# Patient Record
Sex: FEMALE | Race: WHITE | Employment: STUDENT | URBAN - METROPOLITAN AREA
[De-identification: names, ages, dates, MRNs, and addresses within clinical notes are randomized per-mention and may not be internally consistent; named-entity substitution may affect disease eponyms.]

---

## 2018-05-07 NOTE — PROGRESS NOTES
Subjective:       Patient ID: Nikhil Sweeney is a 22 y.o. female with a PMH significant for Hypothyroidism who presents today to establish care.    Chief Complaint: Hypothyroidism (0.433 - TSH)    HPI Patient's mother with Hashimoto's.  Patient has been getting routine thyroid screening since age 19.  She had a recent TSH thru school - TSH was 0.433 and fT4 of 1.32 on 4/18/2018.  She has been depressed lately - no trigger. No SI's.  Has been seeing Psychotherapy and [facility]iatry at [facility] and they wanted to prescribe Prozac, but patient declined.  Patient denies f/c, n/v/d.  No chest pain or SOB.  No abdominal pain or dysuria.  No headaches or change in vision.  No dizziness.  No significant  weight gain or weight loss.  Remaining ROS negative.    Review of Systems   Constitutional: Positive for fatigue. Negative for appetite change, chills, diaphoresis, fever and unexpected weight change.   HENT: Negative for ear pain, hearing loss, sinus pain, tinnitus, trouble swallowing and voice change.    Eyes: Negative for photophobia, pain and visual disturbance.   Respiratory: Negative for chest tightness, shortness of breath and wheezing.    Cardiovascular: Negative for chest pain, palpitations and leg swelling.   Gastrointestinal: Negative for abdominal pain, blood in stool, constipation, diarrhea, nausea and vomiting.        Increased soft BMs, but no diarrhea   Endocrine: Negative for cold intolerance, heat intolerance, polydipsia, polyphagia and polyuria.   Genitourinary: Negative for decreased urine volume, difficulty urinating, dysuria, flank pain, hematuria, pelvic pain, vaginal bleeding, vaginal discharge and vaginal pain.   Musculoskeletal: Negative for arthralgias, gait problem, joint swelling, myalgias and neck pain.   Skin: Negative for rash.   Neurological: Negative for dizziness, tremors, syncope, weakness, numbness and headaches.   Hematological: Does not bruise/bleed easily.   Psychiatric/Behavioral: Positive for  dysphoric mood. Negative for agitation, confusion and sleep disturbance. The patient is not nervous/anxious.        Objective:      Physical Exam   Constitutional: She is oriented to person, place, and time. She appears well-developed and well-nourished. No distress.   HENT:   Head: Normocephalic and atraumatic.   Nose: Nose normal.   Mouth/Throat: Oropharynx is clear and moist.   Eyes: Conjunctivae and EOM are normal. Pupils are equal, round, and reactive to light. No scleral icterus.   Neck: Normal range of motion. Neck supple. No JVD present. No tracheal deviation present. No thyromegaly present.   Cardiovascular: Normal rate, regular rhythm and intact distal pulses.  Exam reveals no gallop and no friction rub.    No murmur heard.  Pulmonary/Chest: Effort normal and breath sounds normal. No respiratory distress. She has no wheezes. She has no rales.   Abdominal: Soft. Bowel sounds are normal. She exhibits no distension. There is no tenderness. There is no rebound and no guarding.   Musculoskeletal: Normal range of motion. She exhibits no edema or deformity.   Lymphadenopathy:     She has no cervical adenopathy.   Neurological: She is alert and oriented to person, place, and time. She displays normal reflexes. No cranial nerve deficit or sensory deficit. She exhibits normal muscle tone. Coordination normal.   Skin: Skin is warm and dry. No rash noted. No erythema.   Psychiatric: She has a normal mood and affect. Her behavior is normal. Thought content normal.       Assessment:       1. Encounter for wellness examination in adult    2. Low TSH level    3. Family history of hypothyroidism    4. DDD (degenerative disc disease), lumbar        Plan:   -Adult Wellness Exam - blood pressure and exam were stable.  I will order routine fasting labs.   We discussed STD screening - HIV 1/2, RPR, Gc/Chlamydia negative in 4/2018 at [place].  -Endocrine - Low TSH with Normal fT4 - suspect subclinical Hyperthyroidism.  Will  repeat TSH, fT4, T3, TPA, TSI.  Will check US of thyroid.  Mother with Hashimoto's Thyroiditis/Hypothyroidism.  -Psych - Depression - as above, followed by [facility] and declined Prozac.  Continue with Psychotherapy.  Exercise.  -MSK - L4-L5 DDD - s/p steroid injections in 2016.  Will refer to [facility], as she would like to start an exercise program.  -GYN - Last Pap was summer 2017 and normal.  We discussed HPV vaccinations - never had them and declined.  -HCM - We discussed Flu (recommend in the Fall) and Tdap (2011) vaccinations.

## 2018-05-08 ENCOUNTER — HOSPITAL ENCOUNTER (OUTPATIENT)
Dept: RADIOLOGY | Facility: OTHER | Age: 23
Discharge: HOME | End: 2018-05-08
Attending: INTERNAL MEDICINE
Payer: COMMERCIAL

## 2018-05-08 ENCOUNTER — PATIENT MESSAGE (OUTPATIENT)
Dept: INTERNAL MEDICINE | Facility: CLINIC | Age: 23
End: 2018-05-08

## 2018-05-08 ENCOUNTER — OFFICE VISIT (OUTPATIENT)
Dept: INTERNAL MEDICINE | Facility: CLINIC | Age: 23
End: 2018-05-08
Payer: COMMERCIAL

## 2018-05-08 VITALS
BODY MASS INDEX: 22.6 KG/M2 | SYSTOLIC BLOOD PRESSURE: 118 MMHG | TEMPERATURE: 98 F | OXYGEN SATURATION: 100 % | HEIGHT: 66 IN | DIASTOLIC BLOOD PRESSURE: 88 MMHG | WEIGHT: 140.63 LBS | HEART RATE: 65 BPM

## 2018-05-08 DIAGNOSIS — R79.89 LOW TSH LEVEL: ICD-10-CM

## 2018-05-08 DIAGNOSIS — Z00.00 ENCOUNTER FOR WELLNESS EXAMINATION IN ADULT: Primary | ICD-10-CM

## 2018-05-08 DIAGNOSIS — Z83.49 FAMILY HISTORY OF HYPOTHYROIDISM: ICD-10-CM

## 2018-05-08 DIAGNOSIS — M51.36 DDD (DEGENERATIVE DISC DISEASE), LUMBAR: ICD-10-CM

## 2018-05-08 PROCEDURE — 3008F BODY MASS INDEX DOCD: CPT | Mod: CPTII,S$GLB,, | Performed by: INTERNAL MEDICINE

## 2018-05-08 PROCEDURE — 76536 US EXAM OF HEAD AND NECK: CPT | Mod: TC

## 2018-05-08 PROCEDURE — 99204 OFFICE O/P NEW MOD 45 MIN: CPT | Mod: S$GLB,,, | Performed by: INTERNAL MEDICINE

## 2018-05-08 PROCEDURE — 76536 US EXAM OF HEAD AND NECK: CPT | Mod: 26,,, | Performed by: RADIOLOGY

## 2018-05-08 PROCEDURE — 99999 PR PBB SHADOW E&M-NEW PATIENT-LVL IV: CPT | Mod: PBBFAC,,, | Performed by: INTERNAL MEDICINE

## 2018-05-08 NOTE — PATIENT INSTRUCTIONS
Your exam was overall normal today.  Your blood pressure was good.  I will order routine fasting labs today - at least 6-8 hours of fasting.    Will will do a work-up on your thyroid function, as well as check an ultrasound of your thyroid gland.  [facility] will [facility].  Return in 4 weeks - sooner if needed.  Please come at least 15-20 minutes before your scheduled appointment time.

## 2018-05-09 ENCOUNTER — PATIENT MESSAGE (OUTPATIENT)
Dept: INTERNAL MEDICINE | Facility: CLINIC | Age: 23
End: 2018-05-09

## 2018-05-10 ENCOUNTER — PATIENT MESSAGE (OUTPATIENT)
Dept: INTERNAL MEDICINE | Facility: CLINIC | Age: 23
End: 2018-05-10

## 2018-05-10 DIAGNOSIS — R93.89 ABNORMAL THYROID ULTRASOUND: Primary | ICD-10-CM

## 2018-05-19 ENCOUNTER — OFFICE VISIT (OUTPATIENT)
Dept: URGENT CARE | Facility: CLINIC | Age: 23
End: 2018-05-19
Payer: COMMERCIAL

## 2018-05-19 VITALS
BODY MASS INDEX: 23.52 KG/M2 | WEIGHT: 141.13 LBS | SYSTOLIC BLOOD PRESSURE: 111 MMHG | OXYGEN SATURATION: 100 % | HEIGHT: 65 IN | TEMPERATURE: 99 F | DIASTOLIC BLOOD PRESSURE: 76 MMHG | HEART RATE: 85 BPM

## 2018-05-19 DIAGNOSIS — J03.90 EXUDATIVE TONSILLITIS: Primary | ICD-10-CM

## 2018-05-19 PROCEDURE — 96372 THER/PROPH/DIAG INJ SC/IM: CPT | Mod: S$GLB,,, | Performed by: INTERNAL MEDICINE

## 2018-05-19 PROCEDURE — 3008F BODY MASS INDEX DOCD: CPT | Mod: CPTII,S$GLB,, | Performed by: INTERNAL MEDICINE

## 2018-05-19 PROCEDURE — 99213 OFFICE O/P EST LOW 20 MIN: CPT | Mod: 25,S$GLB,, | Performed by: INTERNAL MEDICINE

## 2018-05-19 RX ORDER — BETAMETHASONE SODIUM PHOSPHATE AND BETAMETHASONE ACETATE 3; 3 MG/ML; MG/ML
9 INJECTION, SUSPENSION INTRA-ARTICULAR; INTRALESIONAL; INTRAMUSCULAR; SOFT TISSUE ONCE
Status: COMPLETED | OUTPATIENT
Start: 2018-05-19 | End: 2018-05-19

## 2018-05-19 RX ORDER — AMOXICILLIN AND CLAVULANATE POTASSIUM 875; 125 MG/1; MG/1
1 TABLET, FILM COATED ORAL EVERY 12 HOURS
Qty: 20 TABLET | Refills: 0 | Status: SHIPPED | OUTPATIENT
Start: 2018-05-19 | End: 2018-05-29

## 2018-05-19 RX ADMIN — BETAMETHASONE SODIUM PHOSPHATE AND BETAMETHASONE ACETATE 9 MG: 3; 3 INJECTION, SUSPENSION INTRA-ARTICULAR; INTRALESIONAL; INTRAMUSCULAR; SOFT TISSUE at 01:05

## 2018-05-19 NOTE — PROGRESS NOTES
"Subjective:       Patient ID: Nikhil Sweeney is a 22 y.o. female.    Vitals:  height is 5' 5" (1.651 m) and weight is 64 kg (141 lb 1.5 oz). Her oral temperature is 99.3 °F (37.4 °C). Her blood pressure is 111/76 and her pulse is 85. Her oxygen saturation is 100%.     Chief Complaint: Facial Swelling    Swollen node on right side of neck.  Low grade fever today- no fever before today      Edema   This is a new problem. The current episode started in the past 7 days (past 3 days). The problem has been gradually worsening. Associated symptoms include a sore throat. Pertinent negatives include no abdominal pain, chest pain, chills, fever, headaches, nausea, rash or vomiting. The symptoms are aggravated by drinking and eating. She has tried nothing for the symptoms.     Review of Systems   Constitution: Positive for malaise/fatigue. Negative for chills and fever.   HENT: Positive for sore throat.    Eyes: Negative for blurred vision.   Cardiovascular: Negative for chest pain.   Respiratory: Negative for shortness of breath.    Skin: Negative for rash.   Musculoskeletal: Negative for back pain and joint pain.   Gastrointestinal: Negative for abdominal pain, diarrhea, nausea and vomiting.   Neurological: Negative for headaches.   Psychiatric/Behavioral: The patient is not nervous/anxious.        Objective:      Physical Exam   Constitutional: She appears well-developed and well-nourished.   HENT:   Head: Normocephalic and atraumatic.   Mouth/Throat:       Eyes: Conjunctivae and EOM are normal. Pupils are equal, round, and reactive to light.   Neck: Normal range of motion. Neck supple.   R swollen,tender submandibular gland   Cardiovascular: Normal rate and regular rhythm.    Pulmonary/Chest: Effort normal and breath sounds normal.   Nursing note and vitals reviewed.      Assessment:       1. Exudative tonsillitis        Plan:         Exudative tonsillitis  -     betamethasone acetate-betamethasone sodium phosphate injection 9 " mg; Inject 1.5 mLs (9 mg total) into the muscle once.  -     amoxicillin-clavulanate 875-125mg (AUGMENTIN) 875-125 mg per tablet; Take 1 tablet by mouth every 12 (twelve) hours.  Dispense: 20 tablet; Refill: 0

## 2018-05-20 ENCOUNTER — PATIENT MESSAGE (OUTPATIENT)
Dept: INTERNAL MEDICINE | Facility: CLINIC | Age: 23
End: 2018-05-20

## 2018-05-22 ENCOUNTER — PATIENT MESSAGE (OUTPATIENT)
Dept: ADMINISTRATIVE | Facility: HOSPITAL | Age: 23
End: 2018-05-22

## 2018-05-22 ENCOUNTER — PATIENT OUTREACH (OUTPATIENT)
Dept: ADMINISTRATIVE | Facility: HOSPITAL | Age: 23
End: 2018-05-22

## 2018-05-22 NOTE — PROGRESS NOTES
[facility] is committed to your overall health.  To help you get the most out of each of your visits, we will review your information to make sure you are up to date on all of your recommended tests and/or procedures.       Your PCP  Giuliana Carias MD   found that you may be due for:       Health Maintenance Due   Topic Date Due    HPV Vaccines (1 of 3 - Female 3 Dose Series) 09/04/2005    CHLAMYDIA SCREENING  09/04/2009    TETANUS VACCINE  09/04/2012    Pap Smear  09/04/2015     Please be prepared to sign release of information so that we obtain medical records for care that you received outside of [facility]. This is done to make sure your medical record is complete and that you receive the best of care. Thank you.     If you have had any of the above done at another facility, please bring the records or information with you so that your record at [facility] will be complete.  If you would like to schedule any of these, please contact me.     If you are currently taking medication, please bring it with you to your appointment for review.     Also, if you have any type of Advanced Directives, please bring them with you to your office visit so we may scan them into your chart.       Thank you for Choosing [facility] for your healthcare needs.        Additional Information  If you have questions, you can email [email or web address] or call [phone]  to talk to our [facility] staff. Remember, [facility] is NOT to be used for urgent needs. For medical emergencies, dial 911.

## 2018-06-01 NOTE — PROGRESS NOTES
Subjective:       Patient ID: Nikhil Sweeney is a 22 y.o. female with a PMH significant for Hypothyroidism who was seen initially by me on 5/8/2018.    Chief Complaint: No chief complaint on file.    HPI  Patient states she had a fainting spell a week ago - didn't eat and felt a little dizzy/faint.  Her eyes and body felt heavy.  She went to lay down and when she hit the bed, she fainted an found herself on the floor.  No confusion afterwards. No recurrent symptoms.  No palpitations.  No chest pains.   Patient denies f/c, n/v/d.  No chest pain or SOB.  No abdominal pain or dysuria.  No headaches or change in vision.  No dizziness.  No significant  weight gain or weight loss.  Remaining ROS negative.    Review of Systems   Constitutional: Negative for appetite change, chills, diaphoresis, fatigue, fever and unexpected weight change.   HENT: Negative for ear pain, hearing loss, sinus pain, tinnitus, trouble swallowing and voice change.    Eyes: Negative for photophobia, pain and visual disturbance.   Respiratory: Negative for chest tightness, shortness of breath and wheezing.    Cardiovascular: Negative for chest pain, palpitations and leg swelling.   Gastrointestinal: Negative for abdominal pain, blood in stool, constipation, diarrhea, nausea and vomiting.        Increased soft BMs, but no diarrhea   Endocrine: Negative for cold intolerance, heat intolerance, polydipsia, polyphagia and polyuria.   Genitourinary: Negative for decreased urine volume, difficulty urinating, dysuria, flank pain, hematuria, pelvic pain, vaginal bleeding, vaginal discharge and vaginal pain.   Musculoskeletal: Negative for arthralgias, gait problem, joint swelling, myalgias and neck pain.   Skin: Negative for rash.   Neurological: Positive for dizziness. Negative for tremors, syncope, weakness, numbness and headaches.   Hematological: Does not bruise/bleed easily.   Psychiatric/Behavioral: Positive for dysphoric mood. Negative for agitation,  confusion and sleep disturbance. The patient is not nervous/anxious.        Objective:      Physical Exam   Constitutional: She is oriented to person, place, and time. She appears well-developed and well-nourished. No distress.   HENT:   Head: Normocephalic and atraumatic.   Nose: Nose normal.   Mouth/Throat: Oropharynx is clear and moist.   Eyes: Conjunctivae and EOM are normal. Pupils are equal, round, and reactive to light. No scleral icterus.   No nystagmus   Neck: Normal range of motion. Neck supple. No JVD present. No tracheal deviation present. No thyromegaly present.   No carotid bruits   Cardiovascular: Normal rate, regular rhythm, normal heart sounds and intact distal pulses.  Exam reveals no gallop and no friction rub.    No murmur heard.  Pulmonary/Chest: Effort normal and breath sounds normal. No respiratory distress. She has no wheezes. She has no rales.   Abdominal: Soft. Bowel sounds are normal. She exhibits no distension. There is no tenderness. There is no rebound and no guarding.   Musculoskeletal: Normal range of motion. She exhibits no edema or deformity.   Lymphadenopathy:     She has no cervical adenopathy.   Neurological: She is alert and oriented to person, place, and time. She displays normal reflexes. No cranial nerve deficit or sensory deficit. She exhibits normal muscle tone. Coordination normal.   Skin: Skin is warm and dry. No rash noted. She is not diaphoretic. No erythema.   Psychiatric: She has a normal mood and affect. Her behavior is normal. Thought content normal.       Assessment:       1. Vasovagal syncope    2. Thyroid disorder    3. Depression, unspecified depression type    4. Vitamin D insufficiency    5. DDD (degenerative disc disease), lumbar        Plan:   -Today's Visit -   Patient is awake and alert and in NAD.  We reviewed her recent labs.  -Cards - Suspect Vaso Vagal Syncope - atient told MA that she actually fainted after having sex - happened a few minutes after  completion of intercourse when she when to get water and came back to bed.  Will check Orthostatics - normal.  Cardiac exam normal.  No carotic bruits or nystagmus.  Will check EKG.  Will monitor for now.  Consider TTE with recurrent symptoms.  -Endocrine - History of Low TSH with Normal fT4 - suspect subclinical Hyperthyroidism.  Repeat TSH, fT4, T3, TPA, TSI all normal in 5/2018.  US of thyroid 5/8/2018 showed a sinle 0.4mm nodule in the right thyroid lobe (no follow up is required for this size) and suggestion of diffuse hypervascularity of the right thyroid lobe.  .  Mother with Hashimoto's Thyroiditis/Hypothyroidism.  Has Endocrine follow up 9/24/2018.  Vitamin D was low at 17 in 5/2018 - recommended D3 at 2000 units daily.  Lipids in 5/2018 normal.  -Psych - Depression - as above, followed by [facility] and on Prozac (on 3 months - tapering off).  Continue with Psychotherapy.  Exercise.  Overall stable.  -MSK - L4-L5 DDD - s/p steroid injections in 2016.  Referred to [facility] and has appointment today.  -GYN - Last Pap was summer 2017 and normal.  We discussed HPV vaccinations - never had them and declined.  -HCM - We discussed Flu (recommend in the Fall) and Tdap (2011) vaccinations.  We discussed STD screening in 5/2018 - HIV 1/2, RPR, GC/Chlamydia negative in 4/2018 at [facility].  -Follow up in 1 year - sooner if needed.

## 2018-06-04 NOTE — PROGRESS NOTES
Subjective:      Patient ID: Nikhil Sweeney is a 22 y.o. female.    Chief Complaint: Low-back Pain    Referred by: Alex Segovia MD     Ms Sweeney is a 21 yo female here for evaluation of low back pain.  She has a history of DDD at L4-5 with an injection in 2016.  She has had low back pain for the past 5 years.  The pain was worse 2.5 years ago, after long flight, then it got better.  Then nov 2016 she felt an electrical shock through whole back and was given muscle relaxer and went to [place], and then had EVIE.  The injection was very helpful with no pain until jan 2018.  The pain was not as severe.  The pain comes and goes at times.  It will check up on her at times.  The last time was with driving to [place].  The pain is not as bad as it use to be.  She has an MRI but forgot to bring from 2 years ago.  She has pain with sitting, lifting, moving, bending.  She feels like she cannot do certain exercises, when she tries things at the gym like planks her back will hurt.  The pain is worse in the lower back and will feel a pull in right hamstring.  The pain is not constant.  She has not had leg pain recently, she has not had leg pain since the injection in 2016.  The pain is sharp shooting and stabbing pain.  Pain is 0/10 now, worst 6/10 driving, best 0/10 standing and walking.  She cannot stand too long.      Past Medical History:  No date: Depression  No date: Thyroid disease    Past Surgical History:  2016: epidural steroid injection      Comment: Lumbar    Review of patient's family history indicates:  Problem: Hashimoto's thyroiditis      Relation: Mother       Age of Onset: (Not Specified)   Problem: Depression      Relation: Mother       Age of Onset: (Not Specified)   Problem: Anxiety disorder      Relation: Mother       Age of Onset: (Not Specified)   Problem: Heart disease      Relation: Father       Age of Onset: (Not Specified)   Problem: Hypertension      Relation: Father       Age of Onset: (Not Specified)    Problem: Stroke      Relation: Maternal Grandmother       Age of Onset: (Not Specified)   Problem: Hypertension      Relation: Maternal Grandmother       Age of Onset: (Not Specified)   Problem: Heart disease      Relation: Maternal Grandfather       Age of Onset: (Not Specified)   Problem: Cancer      Relation: Paternal Grandfather       Age of Onset: (Not Specified)   Problem: Alcohol abuse      Relation: Paternal Grandfather       Age of Onset: (Not Specified)       Social History    Marital status: Single              Spouse name:                       Years of education:                 Number of children:               Social History Main Topics    Smoking status: Former Smoker                                                                Packs/day: 0.00      Years: 0.00           Types: Cigarettes       Quit date: 5/5/2018    Smokeless tobacco: Former User                       Comment: Just quit 3 days ago, before that, smoked 3              cigarettes per day.     Alcohol use: Yes                Comment: Occasional    Drug use: No              Sexual activity: Yes                      No current outpatient prescriptions on file.  No current facility-administered medications for this visit.       Review of patient's allergies indicates:   -- Acetaminophen -- Hives    --  Difficulty swallowing, drowsiness                Review of Systems   Constitution: Negative for weight gain and weight loss.   Cardiovascular: Negative for chest pain.   Respiratory: Negative for shortness of breath.    Musculoskeletal: Positive for back pain. Negative for joint pain and joint swelling.   Gastrointestinal: Negative for abdominal pain and bowel incontinence.   Genitourinary: Negative for bladder incontinence.   Neurological: Negative for numbness and paresthesias.           Objective:          General    Vitals reviewed.  Constitutional: She is oriented to person, place, and time. She appears well-developed and  well-nourished.   HENT:   Head: Normocephalic and atraumatic.   Pulmonary/Chest: Effort normal.   Neurological: She is alert and oriented to person, place, and time.   Psychiatric: She has a normal mood and affect. Her behavior is normal. Judgment and thought content normal.     General Musculoskeletal Exam   Gait: normal     Right Ankle/Foot Exam     Tests   Heel Walk: able to perform  Tiptoe Walk: able to perform    Left Ankle/Foot Exam     Tests   Heel Walk: able to perform  Tiptoe Walk: able to perform  Back (L-Spine & T-Spine) / Neck (C-Spine) Exam     Tenderness Right paramedian tenderness of the Sacrum.     Back (L-Spine & T-Spine) Range of Motion   Extension: 20   Flexion: 90   Lateral Bend Right: 20   Lateral Bend Left: 20   Rotation Right: 40   Rotation Left: 40     Spinal Sensation   Right Side Sensation  C-Spine Level: normal   L-Spine Level: normal  S-Spine Level: normal  Left Side Sensation  C-Spine Level: normal  L-Spine Level: normal  S-Spine Level: normal    Back (L-Spine & T-Spine) Tests   Right Side Tests  Straight leg raise:      Sitting SLR: > 70 degrees      Left Side Tests  Straight leg raise:     Sitting SLR: > 70 degrees          Other She has no scoliosis .  Spinal Kyphosis:  Absent      Muscle Strength   Right Upper Extremity   Biceps: 5/5/5   Deltoid:  5/5  Triceps:  5/5  Wrist Extension: 5/5/5   Finger Flexors:  5/5  Left Upper Extremity  Biceps: 5/5/5   Deltoid:  5/5  Triceps:  5/5  Wrist Extension: 5/5/5   Finger Flexors:  5/5  Right Lower Extremity   Hip Flexion: 5/5   Quadriceps:  5/5   Anterior tibial:  5/5/5  EHL:  5/5  Left Lower Extremity   Hip Flexion: 5/5   Quadriceps:  5/5   Anterior tibial:  5/5/5   EHL:  5/5    Reflexes     Left Side  Biceps:  2+  Triceps:  2+  Brachioradialis:  2+  Quadriceps:  2+  Achilles:  2+  Left Gutierrez's Sign:  Absent  Babinski Sign:  absent    Right Side   Biceps:  2+  Triceps:  2+  Brachioradialis:  2+  Quadriceps:  2+  Achilles:  2+  Right  Gutierrez's Sign:  absent  Babinski Sign:  absent    Vascular Exam     Right Pulses        Carotid:                  2+    Left Pulses        Carotid:                  2+        Assessment:       Encounter Diagnoses   Name Primary?    Chronic midline low back pain without sciatica Yes    DDD (degenerative disc disease), lumbar          Plan:       Nikhil was seen today for low-back pain.    Diagnoses and all orders for this visit:    Chronic midline low back pain without sciatica  -     Ambulatory consult to [facility]    DDD (degenerative disc disease), lumbar  -     X-Ray Lumbar Complete With Flex And Ext; Future         More than 50% of the total time of 45 minutes was spent in counseling on diagnosis and treatment options. We discussed back pain and the nature of back pain.  We discussed that it is not one thing that causes the pain but an accumulation of multiple things that we do.  She got relief from an EVIE, and pain has not returned to that level, but she does not want it too.  She will feel it flare at times and worries about future because her Dad has back pain.  We discussed posture sitting and the importance of trying to sit better.  She juan to be more aware.  She has a standing desk, we discussed alerts on the phone. We discussed the benefits of therapy and exercise and continuing to move. She has not been able to workout in 3 years and wants to exercise.    1.  X-ray of the lumbar spine  2.  Pt for progressive resistance exercise pattern 1 lumbar at [facility]  3.  NSAID as needed  4.  She will bring outside MRI  5. RTC 4 months

## 2018-06-05 ENCOUNTER — HOSPITAL ENCOUNTER (OUTPATIENT)
Dept: CARDIOLOGY | Facility: OTHER | Age: 23
Discharge: HOME | End: 2018-06-05
Attending: INTERNAL MEDICINE
Payer: COMMERCIAL

## 2018-06-05 ENCOUNTER — HOSPITAL ENCOUNTER (OUTPATIENT)
Dept: RADIOLOGY | Facility: OTHER | Age: 23
Discharge: HOME | End: 2018-06-05
Attending: PHYSICAL MEDICINE & REHABILITATION
Payer: COMMERCIAL

## 2018-06-05 ENCOUNTER — OFFICE VISIT (OUTPATIENT)
Dept: SPINE | Facility: CLINIC | Age: 23
End: 2018-06-05
Attending: PHYSICAL MEDICINE & REHABILITATION
Payer: COMMERCIAL

## 2018-06-05 ENCOUNTER — PATIENT MESSAGE (OUTPATIENT)
Dept: INTERNAL MEDICINE | Facility: CLINIC | Age: 23
End: 2018-06-05

## 2018-06-05 ENCOUNTER — OFFICE VISIT (OUTPATIENT)
Dept: INTERNAL MEDICINE | Facility: CLINIC | Age: 23
End: 2018-06-05
Payer: COMMERCIAL

## 2018-06-05 VITALS
TEMPERATURE: 99 F | RESPIRATION RATE: 14 BRPM | DIASTOLIC BLOOD PRESSURE: 78 MMHG | SYSTOLIC BLOOD PRESSURE: 120 MMHG | HEIGHT: 66 IN | WEIGHT: 142.88 LBS | HEART RATE: 76 BPM | BODY MASS INDEX: 22.96 KG/M2 | OXYGEN SATURATION: 99 %

## 2018-06-05 VITALS
WEIGHT: 142 LBS | HEIGHT: 66 IN | DIASTOLIC BLOOD PRESSURE: 83 MMHG | SYSTOLIC BLOOD PRESSURE: 115 MMHG | HEART RATE: 61 BPM | BODY MASS INDEX: 22.82 KG/M2

## 2018-06-05 DIAGNOSIS — M51.36 DDD (DEGENERATIVE DISC DISEASE), LUMBAR: ICD-10-CM

## 2018-06-05 DIAGNOSIS — G89.29 CHRONIC MIDLINE LOW BACK PAIN WITHOUT SCIATICA: Primary | ICD-10-CM

## 2018-06-05 DIAGNOSIS — M54.50 CHRONIC MIDLINE LOW BACK PAIN WITHOUT SCIATICA: Primary | ICD-10-CM

## 2018-06-05 DIAGNOSIS — E07.9 THYROID DISORDER: ICD-10-CM

## 2018-06-05 DIAGNOSIS — R55 VASOVAGAL SYNCOPE: Primary | ICD-10-CM

## 2018-06-05 DIAGNOSIS — R55 VASOVAGAL SYNCOPE: ICD-10-CM

## 2018-06-05 DIAGNOSIS — F32.A DEPRESSION, UNSPECIFIED DEPRESSION TYPE: ICD-10-CM

## 2018-06-05 DIAGNOSIS — E55.9 VITAMIN D INSUFFICIENCY: ICD-10-CM

## 2018-06-05 PROCEDURE — 72114 X-RAY EXAM L-S SPINE BENDING: CPT | Mod: 26,,, | Performed by: RADIOLOGY

## 2018-06-05 PROCEDURE — 72114 X-RAY EXAM L-S SPINE BENDING: CPT | Mod: TC,FY

## 2018-06-05 PROCEDURE — 99999 PR PBB SHADOW E&M-EST. PATIENT-LVL IV: CPT | Mod: PBBFAC,,, | Performed by: INTERNAL MEDICINE

## 2018-06-05 PROCEDURE — 93005 ELECTROCARDIOGRAM TRACING: CPT

## 2018-06-05 PROCEDURE — 3008F BODY MASS INDEX DOCD: CPT | Mod: CPTII,S$GLB,, | Performed by: PHYSICAL MEDICINE & REHABILITATION

## 2018-06-05 PROCEDURE — 99999 PR PBB SHADOW E&M-EST. PATIENT-LVL III: CPT | Mod: PBBFAC,,, | Performed by: PHYSICAL MEDICINE & REHABILITATION

## 2018-06-05 PROCEDURE — 99214 OFFICE O/P EST MOD 30 MIN: CPT | Mod: S$GLB,,, | Performed by: INTERNAL MEDICINE

## 2018-06-05 PROCEDURE — 93010 ELECTROCARDIOGRAM REPORT: CPT | Mod: ,,, | Performed by: INTERNAL MEDICINE

## 2018-06-05 PROCEDURE — 3008F BODY MASS INDEX DOCD: CPT | Mod: CPTII,S$GLB,, | Performed by: INTERNAL MEDICINE

## 2018-06-05 PROCEDURE — 99204 OFFICE O/P NEW MOD 45 MIN: CPT | Mod: S$GLB,,, | Performed by: PHYSICAL MEDICINE & REHABILITATION

## 2018-06-05 NOTE — PATIENT INSTRUCTIONS
Your exam was overall normal today.  Your blood pressure was good.  Your blood pressure did not change with position.  I suspect your fainting was from Vaso-Vagal Syncope.  I will check an EKG today.  We reviewed your recent labs.  Return in 1 year - sooner if needed.  Please come at least 15-20 minutes before your scheduled appointment time.      Fainting: Vagal Reaction  Fainting (syncope) is a temporary loss of consciousness that is associated with a loss of postural tone. Its also called passing out. It occurs when blood flow to the brain is less than normal. Your healthcare provider believes that your fainting was because of a vagal reaction. This condition is not a sign of serious disease.  A vagal reaction is a response in your body that causes your pulse to slow down or the blood vessels to expand. This causes your blood pressure to fall. And this sends less blood to your brain if you are standing or sitting. That results in dizziness, near-fainting, or fainting. Lying down usually stops the reaction within 60 seconds.  This response can occur during sudden fear, severe pain, emotional stress, overexertion, overheating, hunger, nausea or vomiting, prolonged standing, or standing up after sitting or lying for a long time.  Home care  Follow these guidelines when caring for yourself at home:  · Rest today. Go back to your normal activities as soon as you are feeling back to normal.  · Stay hydrated and avoid skipping meals.  · If you feel lightheaded or dizzy, lie down right away. Or sit with your head lowered between your knees.  Follow-up care  Follow up with your healthcare provider, or as advised.  When to seek medical advice  Call your healthcare provider right away if any of these occur:  · Another fainting spell thats not explained by the common causes listed above  · Pain in your chest, arm, neck, jaw, back, or abdomen  · Shortness of breath  · Severe headache or seizure  · Your heart beats very  rapidly, very slowly, or irregularly (palpitations)  Date Last Reviewed: 12/1/2016  © 6779-3901 [employer], [employer]. [address] [place] [address], [place] [address]. All rights reserved. This information is not intended as a substitute for professional medical care. Always follow your healthcare professional's instructions.

## 2018-06-05 NOTE — LETTER
June 5, 2018      Alex Segovia MD  [address]  [address]  [facility] [address]           [facility]  [address], [address]  [facility] [address]  Phone: [phone]  Fax: [phone]          Patient: Nikhil Sweeney   MR Number: [id number]   YOB: 1995   Date of Visit: 6/5/2018       Dear Dr. Alex Segovia:    Thank you for referring Nikhil Sweeney to me for evaluation. Attached you will find relevant portions of my assessment and plan of care.    If you have questions, please do not hesitate to call me. I look forward to following Nikhil Sweeney along with you.    Sincerely,    Claudine Delvalle MD    Enclosure  CC:  No Recipients    If you would like to receive this communication electronically, please contact [email or web address] or [phone] to request more information on [facility] Link access.    For providers and/or their staff who would like to refer a patient to [facility], please contact us through our one-stop-shop provider referral line, [facility], at [phone].    If you feel you have received this communication in error or would no longer like to receive these types of communications, please e-mail [email or web address]

## 2018-07-24 ENCOUNTER — OFFICE VISIT (OUTPATIENT)
Dept: URGENT CARE | Facility: CLINIC | Age: 23
End: 2018-07-24
Payer: COMMERCIAL

## 2018-07-24 VITALS
OXYGEN SATURATION: 100 % | HEIGHT: 67 IN | WEIGHT: 141.13 LBS | RESPIRATION RATE: 19 BRPM | DIASTOLIC BLOOD PRESSURE: 76 MMHG | SYSTOLIC BLOOD PRESSURE: 113 MMHG | TEMPERATURE: 99 F | HEART RATE: 82 BPM | BODY MASS INDEX: 22.15 KG/M2

## 2018-07-24 DIAGNOSIS — N89.8 VAGINAL DISCHARGE: ICD-10-CM

## 2018-07-24 DIAGNOSIS — Z20.2 POSSIBLE EXPOSURE TO STD: ICD-10-CM

## 2018-07-24 DIAGNOSIS — N89.8 VAGINAL ITCHING: ICD-10-CM

## 2018-07-24 DIAGNOSIS — R30.0 DYSURIA: Primary | ICD-10-CM

## 2018-07-24 LAB
B-HCG UR QL: NEGATIVE
BILIRUB UR QL STRIP: NEGATIVE
CTP QC/QA: YES
GLUCOSE UR QL STRIP: NEGATIVE
KETONES UR QL STRIP: NEGATIVE
LEUKOCYTE ESTERASE UR QL STRIP: NEGATIVE
PH, POC UA: 6.5 (ref 5–8)
POC BLOOD, URINE: POSITIVE
POC NITRATES, URINE: NEGATIVE
PROT UR QL STRIP: NEGATIVE
SP GR UR STRIP: 1.02 (ref 1–1.03)
UROBILINOGEN UR STRIP-ACNC: 1 (ref 0.1–1.1)

## 2018-07-24 PROCEDURE — 81025 URINE PREGNANCY TEST: CPT | Mod: S$GLB,,, | Performed by: NURSE PRACTITIONER

## 2018-07-24 PROCEDURE — 81003 URINALYSIS AUTO W/O SCOPE: CPT | Mod: QW,S$GLB,, | Performed by: NURSE PRACTITIONER

## 2018-07-24 PROCEDURE — 99214 OFFICE O/P EST MOD 30 MIN: CPT | Mod: 25,S$GLB,, | Performed by: NURSE PRACTITIONER

## 2018-07-24 RX ORDER — FLUCONAZOLE 150 MG/1
150 TABLET ORAL DAILY
Qty: 2 TABLET | Refills: 0 | Status: SHIPPED | OUTPATIENT
Start: 2018-07-24 | End: 2018-07-26

## 2018-07-24 RX ORDER — METRONIDAZOLE 500 MG/1
500 TABLET ORAL EVERY 12 HOURS
Qty: 14 TABLET | Refills: 0 | Status: SHIPPED | OUTPATIENT
Start: 2018-07-24 | End: 2018-07-31

## 2018-07-24 NOTE — PATIENT INSTRUCTIONS
Take you rx diflucan as directed for possible yeast.  Take your rx flagyl as directed for BV.    We will call you with the results of your testing in about 4-5 days.    Follow up with OB/GYN for suprapubic cramping, history of ovarian cysts. Please call [phone] if you do not hear from [facility] to get your referral appointment we discussed.      Follow up with your doctor in a few days.  Return to the urgent care or go to the ER if symptoms get worse.      Vaginal Infection: Yeast (Candidiasis)  Yeast infection occurs when yeast in the vagina increase and attacks the vaginal tissues. Yeast is a type of fungus. These infections are often caused by a type of yeast called Candida albicans. Other species of yeast can also cause infections. Factors that may make infection more likely include recent antibiotic use, douching, or increased sex. Yeast infections are more common in women who have diabetes, or are obese or pregnant, or have a weak immune system.  Symptoms of yeast infection  · Clumpy or thin, white discharge, which may look like cottage cheese  · No odor or minimal odor  · Severe vaginal itching or burning  · Burning with urination  · Swelling, redness of vulva  · Pain during sex  Treating yeast infection  Yeast infection is treated with a vaginal antifungal cream. In some cases, antifungal pills are prescribed instead. During treatment:  · Finish all of your medicine, even if your symptoms go away.  · Apply the cream before going to bed. Lie flat after applying so that it doesn't drip out.  · Do not douche or use tampons.  · Don't rely on a diaphragm or condoms, since the cream may weaken them.  · Avoid intercourse if advised by your healthcare provider.     Should I treat a yeast infection myself?  Discuss with your healthcare provider whether you should use over-the-counter medicines to treat a yeast infection. Self-treatment may depend on whether:  · You've had a yeast infection in the  past.  · You're at risk for STDs.  Call your healthcare provider if symptoms do not go away or come back after treatment.   Date Last Reviewed: 3/1/2017  © 4570-2664 [employer]. [address] [place] [address], [place] [address]. All rights reserved. This information is not intended as a substitute for professional medical care. Always follow your healthcare professional's instructions.        Bacterial Vaginosis    You have a vaginal infection called bacterial vaginosis (BV). Both good and bad bacteria are present in a healthy vagina. BV occurs when these bacteria get out of balance. The number of bad bacteria increase. And the number of good bacteria decrease.  BV may or may not cause symptoms. If symptoms do occur, they can include:  · Thin, gray, milky-white, or sometimes green discharge  · Unpleasant odor or fishy smell  · Itching, burning, or pain in or around the vagina  It is not known what causes BV, but certain factors can make the problem more likely. This can include:  · Douching  · Having sex with a new partner  · Having sex with more than one partner  BV will sometimes go away on its own. But treatment is usually recommended. This is because untreated BV can increase the risk of more serious health problems such as:  · Pelvic inflammatory disease (PID)  ·  delivery (giving birth to a baby early if youre pregnant)  · HIV and certain other sexually transmitted diseases (STDs)  · Infection after surgery on the reproductive organs  Home care  General care  · BV is most often treated with medicines called antibiotics. These may be given as pills or as a vaginal cream. If antibiotics are prescribed, be sure to use them exactly as directed. Also, be sure to complete all of the medicine, even if your symptoms go away.  · Avoid douching or having sex during treatment.  · If you have sex with a female partner, ask your healthcare provider if she should also be treated.  Prevention  · Limit or  avoid douching.  · Avoid having sex. If you do have sex, then take steps to lower your risk:  ¨ Use condoms when having sex.  ¨ Limit the number of partners you have sex with.  Follow-up care  Follow up with your healthcare provider, or as advised.  When to seek medical advice  Call your healthcare provider right away if:  · You have a fever of 100.4ºF (38ºC) or higher, or as directed by your provider.  · Your symptoms worsen, or they dont go away within a few days of starting treatment.  · You have new pain in the lower belly or pelvic region.  · You have side effects that bother you or a reaction to the pills or cream youre prescribed.  · You or any partners you have sex with have new symptoms, such as a rash, joint pain, or sores.  Date Last Reviewed: 7/30/2015  © 7406-1247 [employer]. [address] [place] [address], [place] [address]. All rights reserved. This information is not intended as a substitute for professional medical care. Always follow your healthcare professional's instructions.

## 2018-07-24 NOTE — PROGRESS NOTES
"Subjective:       Patient ID: Nikhil Sweeney is a 22 y.o. female.    Vitals:  height is 5' 6.53" (1.69 m) and weight is 64 kg (141 lb 1.5 oz). Her oral temperature is 98.7 °F (37.1 °C). Her blood pressure is 113/76 and her pulse is 82. Her respiration is 19 and oxygen saturation is 100%.     Chief Complaint: Urinary Tract Infection    Patient reports discharge, odor, and itching. She is not concerned about STD-repots she was tests in April 2018. She has a new partner as of 2 weeks ago and symptoms started over 1 month ago. She does report multiple partners. She reports history of ovarian cysts many years ago and hx of BV 3-4 occurences. She denies douching and uses condoms. She is currently not on birth control r/t to her depression medication and side effects.       Urinary Tract Infection    This is a new problem. The current episode started more than 1 month ago. The problem occurs every urination. The problem has been gradually worsening. The quality of the pain is described as aching (cramp). The pain is at a severity of 6/10. The pain is moderate. There has been no fever. She is not sexually active. There is no history of pyelonephritis. Associated symptoms include urgency. Pertinent negatives include no chills, hematuria, nausea or vomiting. She has tried nothing for the symptoms. The treatment provided no relief.     Review of Systems   Constitution: Negative for chills and fever.   Skin: Negative for itching.   Musculoskeletal: Negative for back pain.   Gastrointestinal: Positive for abdominal pain. Negative for nausea and vomiting.   Genitourinary: Positive for dysuria and urgency. Negative for genital sores, hematuria, missed menses and non-menstrual bleeding.       Objective:      Physical Exam   Constitutional: She is oriented to person, place, and time. She appears well-developed and well-nourished.   HENT:   Head: Normocephalic and atraumatic.   Right Ear: External ear normal.   Left Ear: External ear " normal.   Nose: Nose normal.   Eyes: Lids are normal.   Neck: Trachea normal, normal range of motion and phonation normal. Neck supple.   Cardiovascular: Normal pulses.    Pulmonary/Chest: Effort normal.   Abdominal: Soft. Normal appearance and bowel sounds are normal. She exhibits no distension. There is tenderness (mild cramping) in the suprapubic area. There is no CVA tenderness.   Genitourinary: Pelvic exam was performed with patient supine. No tenderness or bleeding in the vagina. Vaginal discharge (white, moderate amount) found.   Genitourinary Comments: No odor noted.  Vaginal exam performed. Patient supine. Speculum    Neurological: She is alert and oriented to person, place, and time.   Skin: Skin is warm, dry and intact.   Psychiatric: She has a normal mood and affect. Her speech is normal and behavior is normal. Cognition and memory are normal.   Nursing note and vitals reviewed.      Results for orders placed or performed in visit on 07/24/18   POCT Urinalysis, Dipstick, Automated, W/O Scope   Result Value Ref Range    POC Blood, Urine Positive (A) Negative    POC Bilirubin, Urine Negative Negative    POC Urobilinogen, Urine 1.0 0.1 - 1.1    POC Ketones, Urine Negative Negative    POC Protein, Urine Negative Negative    POC Nitrates, Urine Negative Negative    POC Glucose, Urine Negative Negative    pH, UA 6.5 5 - 8    POC Specific Gravity, Urine 1.020 1.003 - 1.029    POC Leukocytes, Urine Negative Negative   POCT urine pregnancy   Result Value Ref Range    POC Preg Test, Ur Negative Negative     Acceptable Yes        Assessment:       1. Dysuria    2. Vaginal itching    3. Vaginal discharge    4. Possible exposure to STD        Plan:     Discussed we will cover for yeast and BV. Encouraged STD screen with recent new partners-patient agreed. Will call with results of testing when resulted. Encouraged to follow up with ob/gyn referral.    Dysuria  -     POCT Urinalysis, Dipstick,  Automated, W/O Scope  -     POCT urine pregnancy  -     Ambulatory referral to Obstetrics / Gynecology  -     Vaginosis Screen by DNA Probe    Vaginal itching  -     fluconazole (DIFLUCAN) 150 MG Tab; Take 1 tablet (150 mg total) by mouth once daily. If symptoms persist, you can take the 2nd tablet 2 days later. for 2 days  Dispense: 2 tablet; Refill: 0  -     metroNIDAZOLE (FLAGYL) 500 MG tablet; Take 1 tablet (500 mg total) by mouth every 12 (twelve) hours. for 7 days  Dispense: 14 tablet; Refill: 0  -     Ambulatory referral to Obstetrics / Gynecology  -     Vaginosis Screen by DNA Probe  -     NuSwab Vaginitis Plus (VG+)    Vaginal discharge  -     fluconazole (DIFLUCAN) 150 MG Tab; Take 1 tablet (150 mg total) by mouth once daily. If symptoms persist, you can take the 2nd tablet 2 days later. for 2 days  Dispense: 2 tablet; Refill: 0  -     metroNIDAZOLE (FLAGYL) 500 MG tablet; Take 1 tablet (500 mg total) by mouth every 12 (twelve) hours. for 7 days  Dispense: 14 tablet; Refill: 0  -     Ambulatory referral to Obstetrics / Gynecology  -     Vaginosis Screen by DNA Probe  -     NuSwab Vaginitis Plus (VG+)    Possible exposure to STD  -     NuSwab Vaginitis Plus (VG+)

## 2018-07-29 ENCOUNTER — TELEPHONE (OUTPATIENT)
Dept: URGENT CARE | Facility: CLINIC | Age: 23
End: 2018-07-29

## 2018-07-29 LAB
A VAGINAE DNA VAG QL NAA+PROBE: ABNORMAL SCORE
BVAB2 DNA VAG QL NAA+PROBE: ABNORMAL SCORE
C ALBICANS DNA VAG QL NAA+PROBE: NEGATIVE
C GLABRATA DNA VAG QL NAA+PROBE: NEGATIVE
C TRACH RRNA SPEC QL NAA+PROBE: NEGATIVE
MEGA1 DNA VAG QL NAA+PROBE: ABNORMAL SCORE
N GONORRHOEA RRNA SPEC QL NAA+PROBE: NEGATIVE
T VAGINALIS RRNA SPEC QL NAA+PROBE: NEGATIVE

## 2018-10-04 ENCOUNTER — TELEPHONE (OUTPATIENT)
Dept: SPINE | Facility: CLINIC | Age: 23
End: 2018-10-04